# Patient Record
Sex: FEMALE | Race: WHITE | NOT HISPANIC OR LATINO
[De-identification: names, ages, dates, MRNs, and addresses within clinical notes are randomized per-mention and may not be internally consistent; named-entity substitution may affect disease eponyms.]

---

## 2019-12-27 ENCOUNTER — APPOINTMENT (OUTPATIENT)
Dept: OTOLARYNGOLOGY | Facility: CLINIC | Age: 34
End: 2019-12-27
Payer: COMMERCIAL

## 2019-12-27 DIAGNOSIS — J01.01 ACUTE RECURRENT MAXILLARY SINUSITIS: ICD-10-CM

## 2019-12-27 PROBLEM — Z00.00 ENCOUNTER FOR PREVENTIVE HEALTH EXAMINATION: Status: ACTIVE | Noted: 2019-12-27

## 2019-12-27 PROCEDURE — 99213 OFFICE O/P EST LOW 20 MIN: CPT | Mod: 25

## 2019-12-27 PROCEDURE — 31231 NASAL ENDOSCOPY DX: CPT

## 2019-12-27 RX ORDER — LORATADINE 5 MG/5 ML
SOLUTION, ORAL ORAL
Refills: 0 | Status: ACTIVE | COMMUNITY

## 2019-12-27 NOTE — ASSESSMENT
[FreeTextEntry1] : Upper respiratory tract infection, viral versus bacterial. We will check the result of her culture. I have given her a course of Augmentin to hold it. We also discussed use of saline and Afrin, as Sudafed has made her quite dry. I refilled her Flonase. Follow up in 2 weeks if not resolved

## 2019-12-27 NOTE — HISTORY OF PRESENT ILLNESS
[de-identified] : Patient well known to me that is dysfunctional endoscopic sinus surgery and septorhinoplasty in the fell many years ago. She has a history of recurrent sinusitis. She delivered her daughter 10 months ago and has multiple sick childhood contacts. 2 weeks ago developed sore throat and other upper digestive tract symptoms were subsequently improved. 6 days ago developed severe cough, nasal congestion, and sinus headache associated with purulent mucus. She is not sure she's had fevers. No recent antibiotic course

## 2020-01-06 LAB — BACTERIA FLD CULT: NORMAL

## 2021-08-05 ENCOUNTER — APPOINTMENT (OUTPATIENT)
Dept: OTOLARYNGOLOGY | Facility: CLINIC | Age: 36
End: 2021-08-05
Payer: COMMERCIAL

## 2021-08-05 VITALS — BODY MASS INDEX: 20.88 KG/M2 | HEIGHT: 67 IN | WEIGHT: 133 LBS | TEMPERATURE: 97.7 F

## 2021-08-05 DIAGNOSIS — J31.0 CHRONIC RHINITIS: ICD-10-CM

## 2021-08-05 DIAGNOSIS — K21.9 GASTRO-ESOPHAGEAL REFLUX DISEASE W/OUT ESOPHAGITIS: ICD-10-CM

## 2021-08-05 PROCEDURE — 99214 OFFICE O/P EST MOD 30 MIN: CPT | Mod: 25

## 2021-08-05 PROCEDURE — 31231 NASAL ENDOSCOPY DX: CPT

## 2021-08-05 RX ORDER — DOXYLAMINE SUCCINATE AND PYRIDOXINE HYDROCHLORIDE 10; 10 MG/1; MG/1
TABLET, DELAYED RELEASE ORAL
Refills: 0 | Status: ACTIVE | COMMUNITY

## 2021-08-05 NOTE — HISTORY OF PRESENT ILLNESS
[de-identified] : Patient well known to me for previous endoscopic sinus surgery and septorhinoplasty x2 many years ago. She has a history of recurrent sinusitis. She delivered her daughter 2019, treated with Augmentin.\par \par Now is 13 weeks pregnant again, reports intermittant headaches, severe PND that makes her nauseous enough to gag and vomit.  Takes Claritin nightly.  Notes foul smell in the nose 2 days ago but no purulent drainage

## 2021-08-05 NOTE — ASSESSMENT
[FreeTextEntry1] : Sanchez and postnasal drip in the setting of pregnancy. No evidence of acute sinusitis on endoscopy. Recommended saline irrigations, Flonase and Astelin if her OB per patient. We also talked about possible reflux etiology and she may benefit from an H2 blocker. We discussed at that reflux can make postnasal drip worse.\par FU if not improved

## 2022-04-12 ENCOUNTER — APPOINTMENT (OUTPATIENT)
Dept: OTOLARYNGOLOGY | Facility: CLINIC | Age: 37
End: 2022-04-12
Payer: COMMERCIAL

## 2022-04-12 VITALS — HEIGHT: 67 IN | WEIGHT: 135 LBS | BODY MASS INDEX: 21.19 KG/M2 | TEMPERATURE: 98 F

## 2022-04-12 DIAGNOSIS — H93.11 TINNITUS, RIGHT EAR: ICD-10-CM

## 2022-04-12 DIAGNOSIS — H92.03 OTALGIA, BILATERAL: ICD-10-CM

## 2022-04-12 DIAGNOSIS — H93.293 OTHER ABNORMAL AUDITORY PERCEPTIONS, BILATERAL: ICD-10-CM

## 2022-04-12 PROCEDURE — 92567 TYMPANOMETRY: CPT

## 2022-04-12 PROCEDURE — 99213 OFFICE O/P EST LOW 20 MIN: CPT

## 2022-04-12 PROCEDURE — 92557 COMPREHENSIVE HEARING TEST: CPT

## 2022-04-12 RX ORDER — AZELASTINE HYDROCHLORIDE 137 UG/1
0.1 SPRAY, METERED NASAL TWICE DAILY
Qty: 1 | Refills: 0 | Status: COMPLETED | COMMUNITY
Start: 2021-08-05 | End: 2022-04-12

## 2022-04-12 RX ORDER — AMOXICILLIN AND CLAVULANATE POTASSIUM 875; 125 MG/1; MG/1
875-125 TABLET, COATED ORAL
Qty: 20 | Refills: 0 | Status: COMPLETED | COMMUNITY
Start: 2019-12-27 | End: 2022-04-12

## 2022-04-12 RX ORDER — FLUTICASONE FUROATE 27.5 UG/1
27.5 SPRAY, METERED NASAL DAILY
Qty: 1 | Refills: 5 | Status: COMPLETED | COMMUNITY
Start: 2019-12-27 | End: 2022-04-12

## 2022-04-12 RX ORDER — AZELASTINE HYDROCHLORIDE 137 UG/1
137 SPRAY, METERED NASAL TWICE DAILY
Qty: 1 | Refills: 3 | Status: COMPLETED | COMMUNITY
Start: 2021-09-16 | End: 2022-04-12

## 2022-04-12 NOTE — ASSESSMENT
[FreeTextEntry1] : Otalgia, possibly TMJ.  We also discussed the remote possibility of zoster given the radiation of the pain across the V2 dermatome.  However it is unusual that it also radiates across to the opposite (left) side of the face which would be a different dermatome.  I asked her to observe TMJ precautions, soft diet, warm compresses, wear her bite block and treat herself symptomatically with ibuprofen.  She understands that in order to do further work-up we would absolutely need nasal endoscopy to evaluate sinus cavities in the nasopharynx, which she is currently refusing for financial reasons.  She will follow-up in 3 days if not improved and we will proceed with endoscopy at that point.\par FU if not improved

## 2022-04-12 NOTE — HISTORY OF PRESENT ILLNESS
[de-identified] : Patient well known to me for previous endoscopic sinus surgery and septorhinoplasty x2 many years ago. She has a history of recurrent sinusitis. She delivered her daughter 2019, treated with Augmentin.\par \kacy Seen Aug 5, was 13 weeks pregnant again, reports intermittant headaches, severe PND that makes her nauseous enough to gag and vomit.  Takes Claritin nightly.  Notes foul smell in the nose 2 days ago but no purulent drainage.  She reports she lost the baby at 6-1/2 months after fetal neoplasm was identified and treated at [facility]\par \par Today reports several day history of severe sharp pain in the right ear that radiates across her face and nose in a wave to the left ear.  She also has some facial pressure and associated headache.  Denies recent upper respiratory tract infection, flew last 4 weeks ago.  She does have a history of TMJ and has not been wearing her bite block.  It has been extremely stressful undergoing IVF again.  Denies recent dental issues or hearing loss but did have tinnitus in her right ear for 30 seconds on the first day of onset of symptoms, since resolved

## 2022-04-25 ENCOUNTER — TRANSCRIPTION ENCOUNTER (OUTPATIENT)
Age: 37
End: 2022-04-25

## 2022-12-06 ENCOUNTER — APPOINTMENT (OUTPATIENT)
Dept: OTOLARYNGOLOGY | Facility: CLINIC | Age: 37
End: 2022-12-06